# Patient Record
Sex: MALE | Race: ASIAN | NOT HISPANIC OR LATINO | URBAN - METROPOLITAN AREA
[De-identification: names, ages, dates, MRNs, and addresses within clinical notes are randomized per-mention and may not be internally consistent; named-entity substitution may affect disease eponyms.]

---

## 2018-08-05 ENCOUNTER — EMERGENCY (EMERGENCY)
Facility: HOSPITAL | Age: 23
LOS: 1 days | Discharge: HOME | End: 2018-08-05
Attending: EMERGENCY MEDICINE | Admitting: EMERGENCY MEDICINE
Payer: COMMERCIAL

## 2018-08-05 VITALS
OXYGEN SATURATION: 97 % | RESPIRATION RATE: 15 BRPM | DIASTOLIC BLOOD PRESSURE: 76 MMHG | TEMPERATURE: 99 F | HEART RATE: 88 BPM | SYSTOLIC BLOOD PRESSURE: 114 MMHG

## 2018-08-05 PROCEDURE — 99283 EMERGENCY DEPT VISIT LOW MDM: CPT | Mod: 25

## 2018-08-05 RX ORDER — IBUPROFEN 200 MG
600 TABLET ORAL ONCE
Qty: 0 | Refills: 0 | Status: COMPLETED | OUTPATIENT
Start: 2018-08-05 | End: 2018-08-05

## 2018-08-05 RX ORDER — ACETAMINOPHEN 500 MG
975 TABLET ORAL ONCE
Qty: 0 | Refills: 0 | Status: COMPLETED | OUTPATIENT
Start: 2018-08-05 | End: 2018-08-05

## 2018-08-05 RX ORDER — LIDOCAINE 4 G/100G
1 CREAM TOPICAL ONCE
Qty: 0 | Refills: 0 | Status: COMPLETED | OUTPATIENT
Start: 2018-08-05 | End: 2018-08-05

## 2018-08-05 RX ADMIN — LIDOCAINE 1 PATCH: 4 CREAM TOPICAL at 07:58

## 2018-08-05 RX ADMIN — Medication 600 MILLIGRAM(S): at 07:58

## 2018-08-05 RX ADMIN — Medication 975 MILLIGRAM(S): at 07:58

## 2018-08-05 NOTE — ED PROVIDER NOTE - MEDICAL DECISION MAKING DETAILS
23 Y M with atraumatic pain of the RLE in the anterior mid thigh area without overlying visual changes and still able to ambulate with only mild pain with certain motions. DDx: Strain/sprain. Patients hx consistent with soft tissue injury made worse by certain motions, improved with rest. Will give PO medications, lido patch. If pain not improved will consider imaging or further testing PRN.

## 2018-08-05 NOTE — ED ADULT TRIAGE NOTE - CHIEF COMPLAINT QUOTE
Pt c/o R upper thigh pain starting yesterday, worse this AM. Denies any injury.  +ROM to affected extremity.  Denies any urinary symptoms.  Denies any recent car rides or flights.  Denies any PMHx.

## 2018-08-05 NOTE — ED PROVIDER NOTE - OBJECTIVE STATEMENT
23 Y M with no pmhx presents with 2 days of upper R leg pain without known injury to the area. Pt states that he noticed some pain in his upper R leg that has been gradually worse over 2 days. He says that it felt like he pulled a muscle, located in the upper mid thigh on the internal aspect. He notices that the pain gets worse when moves the leg and seems better when he is laying still. He denies fever, nausea, vomiting, swelling, weakness, bruising or bleeding.

## 2018-08-05 NOTE — ED PROVIDER NOTE - ATTENDING CONTRIBUTION TO CARE
I performed a face to face bedside interview with patient regarding history of present illness, review of symptoms and past medical history. I completed an independent physical exam.  I have discussed patient's plan of care.   I agree with note as stated above, having amended the EMR as needed to reflect my findings. I have discussed the assessment and plan of care.  This includes during the time I functioned as the attending physician for this patient.  Attending Contribution to Care: agree with plan of resident. pt p/w r mid thigh pain, no rash, cellulitis, shingles. pulses intact, no skin changes. pt ambulating with persistent albeit improved able to ambulate. instructed to apply heat packs, ibuprofen and tylenol . stable for d/c. vss

## 2018-08-05 NOTE — ED PROVIDER NOTE - PHYSICAL EXAMINATION
MSK: Normal LLE, bilateral UEs.  RLE: visual inspection normal with swelling, redness, or bleeding. Pt without TTP in the area, no crepitus. There is mildly decreased MS of the thigh 2/2 pain. Patient is able to ambulate without difficulty and only mild pain, he is able to stand on the RLE alone without difficulty.

## 2021-01-13 ENCOUNTER — APPOINTMENT (OUTPATIENT)
Dept: NEUROLOGY | Facility: CLINIC | Age: 26
End: 2021-01-13
Payer: COMMERCIAL

## 2021-01-13 VITALS
DIASTOLIC BLOOD PRESSURE: 78 MMHG | WEIGHT: 176 LBS | SYSTOLIC BLOOD PRESSURE: 119 MMHG | HEIGHT: 66 IN | BODY MASS INDEX: 28.28 KG/M2 | HEART RATE: 72 BPM

## 2021-01-13 VITALS — TEMPERATURE: 98.3 F

## 2021-01-13 PROCEDURE — 99203 OFFICE O/P NEW LOW 30 MIN: CPT

## 2021-01-13 PROCEDURE — 99072 ADDL SUPL MATRL&STAF TM PHE: CPT

## 2021-01-13 RX ORDER — MECLIZINE HYDROCHLORIDE 25 MG/1
25 TABLET ORAL 3 TIMES DAILY
Qty: 50 | Refills: 0 | Status: ACTIVE | COMMUNITY
Start: 2021-01-13 | End: 1900-01-01

## 2021-01-13 NOTE — PHYSICAL EXAM
[FreeTextEntry1] : Constitutional:  Patient was well-developed, well-nourished and in no acute distress. \par \par Head:  Normocephalic, atraumatic. Tympanic membranes were clear. \par \par Neck:  Supple with full range of motion. \par \par Cardiovascular:  Cardiac rhythm was regular without murmur. There were no carotid bruits. Peripheral pulses were full and symmetric. \par \par Respiratory:  Lungs were clear. \par \par Abdomen:  Soft and nontender. \par \par Spine:  Nontender. \par \par Skin:  There were no rashes. \par \par NEUROLOGICAL EXAMINATION:\par \par Mental Status: Patient was alert and oriented. Speech was fluent. There was no dysarthria. \par \par Cranial Nerves: \par \par II: Visual acuity was 20/ 20 bilaterally with near card and glasses. Pupils were equal and reactive. Visual fields were full. Funduscopic examination was normal. \par \par III, IV, VI:  Eye movements were full without nystagmus. \par \par V: Facial sensation was intact. \par \par VII: Facial strength was normal. \par \par VIII: Hearing was equal.  When placed in a right lateral position, he became dizzy and had mild clockwise beating rotatory nystagmus.  The left side was not evaluated due to recent shoulder surgery.\par \par IX, X: Palatal movement was normal. Phonation was normal. \par \par XI: Sternocleidomastoids and trapezii were normal. \par \par XII: Tongue was midline and movements normal. There was no lingual atrophy or fasciculations. \par \par Motor Examination: Muscle bulk, tone and strength were normal. \par \par Sensory Examination: Pinprick, vibration and joint position sense were intact. \par \par Reflexes: DTRs were 1 at the biceps and knees, 2 at the ankles and absent elsewhere.\par \par Plantar Responses: Plantar responses were flexor. \par \par Coordination/Cerebellar Function: There was no dysmetria on finger to nose or heel to shin testing. \par \par Gait/Stance: Gait and tandem were normal. Romberg was negative.\par

## 2021-01-13 NOTE — ASSESSMENT
[FreeTextEntry1] : Mr. Duncan is a 25-year-old with a long history of paroxysmal vestibular symptoms.  His symptoms are easily reproducible by positional change.  I do not believe that this represents "basilar migraine" but rather peripheral vestibular dysfunction.\par \par I prescribed meclizine for symptomatic treatment.  He will be referred for neurotology consultation.  I will request an MRI of the brain and internal auditory canals with and without contrast.  For completeness, I will also order a serologic evaluation including 68 Kd antibodies.  Further management will depend upon these results and his clinical course.

## 2021-01-13 NOTE — HISTORY OF PRESENT ILLNESS
[FreeTextEntry1] : I had the pleasure of seeing Mr. Vincenzo Duncan in the office today.  He is a 25 year right-handed patient who was referred for neurologic evaluation at your kind suggestion.\par \par Mr. Duncan has a long history of paroxysmal dizziness and nausea dating back to his early childhood.  In 2015, he experienced an acute attack of dizziness and nausea aggravated by change in position and gaze.  He was evaluated in the emergency room.  His symptoms resolved after treatment with Toradol.  He experienced subsequent brief episodes following 2 head transplant procedures in 2016 and 2017.\par \par On December 1, 2020, he underwent a left shoulder labral and biceps tendon repair.  About 4 days later, he developed dizziness and nausea aggravated by downward gaze and movement.  This gradually improved over 2 weeks but then recurred about 5 days later.  When he assumes a supine position and turns his head to the right he becomes dizzy and nauseous.\par \par He is experienced 2 recent brief episodes of bitemporal and periorbital throbbing headache relieved with Advil.\par \par He denies cognitive, visual, otic, swallowing, motor, sensory or sphincteric difficulties.  It does not appear that he ever underwent cerebral imaging.\par

## 2021-01-23 ENCOUNTER — LABORATORY RESULT (OUTPATIENT)
Age: 26
End: 2021-01-23

## 2021-01-24 ENCOUNTER — NON-APPOINTMENT (OUTPATIENT)
Age: 26
End: 2021-01-24

## 2021-01-24 LAB
ALBUMIN SERPL ELPH-MCNC: 4.6 G/DL
ALP BLD-CCNC: 72 U/L
ALT SERPL-CCNC: 42 U/L
ANION GAP SERPL CALC-SCNC: 13 MMOL/L
AST SERPL-CCNC: 20 U/L
B BURGDOR IGG+IGM SER QL IB: NORMAL
BASOPHILS # BLD AUTO: 0.04 K/UL
BASOPHILS NFR BLD AUTO: 0.7 %
BILIRUB SERPL-MCNC: 0.3 MG/DL
BUN SERPL-MCNC: 12 MG/DL
CALCIUM SERPL-MCNC: 9.7 MG/DL
CHLORIDE SERPL-SCNC: 103 MMOL/L
CO2 SERPL-SCNC: 24 MMOL/L
CREAT SERPL-MCNC: 0.91 MG/DL
CRP SERPL-MCNC: 0.34 MG/DL
EOSINOPHIL # BLD AUTO: 0.09 K/UL
EOSINOPHIL NFR BLD AUTO: 1.5 %
ERYTHROCYTE [SEDIMENTATION RATE] IN BLOOD BY WESTERGREN METHOD: 7 MM/HR
GLUCOSE SERPL-MCNC: 112 MG/DL
HCT VFR BLD CALC: 42.9 %
HGB BLD-MCNC: 13.9 G/DL
IMM GRANULOCYTES NFR BLD AUTO: 0.2 %
LYMPHOCYTES # BLD AUTO: 2.3 K/UL
LYMPHOCYTES NFR BLD AUTO: 39.1 %
MAN DIFF?: NORMAL
MCHC RBC-ENTMCNC: 25.6 PG
MCHC RBC-ENTMCNC: 32.4 GM/DL
MCV RBC AUTO: 79.2 FL
MONOCYTES # BLD AUTO: 0.4 K/UL
MONOCYTES NFR BLD AUTO: 6.8 %
NEUTROPHILS # BLD AUTO: 3.04 K/UL
NEUTROPHILS NFR BLD AUTO: 51.7 %
PLATELET # BLD AUTO: 189 K/UL
POTASSIUM SERPL-SCNC: 4.3 MMOL/L
PROT SERPL-MCNC: 7.2 G/DL
RBC # BLD: 5.42 M/UL
RBC # FLD: 13.2 %
SODIUM SERPL-SCNC: 140 MMOL/L
TSH SERPL-ACNC: 2.07 UIU/ML
WBC # FLD AUTO: 5.88 K/UL

## 2021-01-25 LAB
B BURGDOR AB SER-IMP: NEGATIVE
B BURGDOR IGM PATRN SER IB-IMP: NEGATIVE
B BURGDOR18KD IGG SER QL IB: PRESENT
B BURGDOR23KD IGG SER QL IB: NORMAL
B BURGDOR23KD IGM SER QL IB: NORMAL
B BURGDOR28KD IGG SER QL IB: NORMAL
B BURGDOR30KD IGG SER QL IB: NORMAL
B BURGDOR31KD IGG SER QL IB: NORMAL
B BURGDOR39KD IGG SER QL IB: NORMAL
B BURGDOR39KD IGM SER QL IB: NORMAL
B BURGDOR41KD IGG SER QL IB: PRESENT
B BURGDOR41KD IGM SER QL IB: NORMAL
B BURGDOR45KD IGG SER QL IB: NORMAL
B BURGDOR58KD IGG SER QL IB: NORMAL
B BURGDOR66KD IGG SER QL IB: NORMAL
B BURGDOR93KD IGG SER QL IB: NORMAL
T PALLIDUM AB SER QL IA: NEGATIVE

## 2021-01-26 LAB
ANACR T: NEGATIVE
INNER EAR 68KD AB FLD QL: NEGATIVE

## 2021-02-20 ENCOUNTER — APPOINTMENT (OUTPATIENT)
Dept: MRI IMAGING | Facility: CLINIC | Age: 26
End: 2021-02-20
Payer: COMMERCIAL

## 2021-02-20 ENCOUNTER — OUTPATIENT (OUTPATIENT)
Dept: OUTPATIENT SERVICES | Facility: HOSPITAL | Age: 26
LOS: 1 days | End: 2021-02-20
Payer: COMMERCIAL

## 2021-02-20 DIAGNOSIS — R42 DIZZINESS AND GIDDINESS: ICD-10-CM

## 2021-02-20 PROCEDURE — 70553 MRI BRAIN STEM W/O & W/DYE: CPT

## 2021-02-20 PROCEDURE — A9585: CPT

## 2021-02-20 PROCEDURE — 70553 MRI BRAIN STEM W/O & W/DYE: CPT | Mod: 26

## 2021-02-23 ENCOUNTER — APPOINTMENT (OUTPATIENT)
Dept: OTOLARYNGOLOGY | Facility: CLINIC | Age: 26
End: 2021-02-23
Payer: COMMERCIAL

## 2021-02-23 ENCOUNTER — NON-APPOINTMENT (OUTPATIENT)
Age: 26
End: 2021-02-23

## 2021-02-23 PROCEDURE — 99072 ADDL SUPL MATRL&STAF TM PHE: CPT

## 2021-02-23 PROCEDURE — 92557 COMPREHENSIVE HEARING TEST: CPT

## 2021-02-23 PROCEDURE — 92567 TYMPANOMETRY: CPT

## 2021-02-23 PROCEDURE — 99205 OFFICE O/P NEW HI 60 MIN: CPT | Mod: 25

## 2021-02-24 NOTE — PROCEDURE
[Risk and Benefits Discussed] : The purpose, risks, discomforts, benefits and alternatives of the procedure have been explained to the patient including no treatment. [Cerumen Impaction] : Cerumen Impaction [Same] : same as the Pre Op Dx. [] : Removal of Cerumen [FreeTextEntry1] : vertigo [FreeTextEntry4] : none [FreeTextEntry6] : Cerumen was removed under binocular microscopy with a combination of a suction and/or a loop curette. The patient tolerated the procedure well and there were no complications. The included findings were noted.\par

## 2021-02-24 NOTE — HISTORY OF PRESENT ILLNESS
[de-identified] : 26 y/o male referred by Dr. Law for dizziness.  Dizziness initially started after should surgery on 12/1/2020.  Immediately after surgery he had two weeks of dizziness with head movement.  This gradually improved.  This has since evolved into room spinning vertigo when laying in bed and rolling onto the right side.  Pt. has started STARS VRT (eval c/w vest. neuritis) and has had a normal MRI.  Today pt. feels almost normal, just dizzy when transitioning from laying to sitting.

## 2021-02-24 NOTE — PHYSICAL EXAM
[Binocular Microscopic Exam] : Binocular microscopic exam was performed [Normal] : no rashes [Nystagmus] : ~T no ~M nystagmus was seen [Fukuda Step Test] : Fukuda Step Test was Negative [Romberg's Sign] : Romberg's sign was absent [Fistula Sign] : Fistula Sign: Negative [Past-Pointing] : Past-Pointing: Negative [[place]] : [place]: Negative [FreeTextEntry9] : cerumen impaction [FreeTextEntry1] : negative jayme-hallpike exam today.\par Pt. with brief dizziness when sitting up after exam c/w orthostatic hypotension

## 2021-02-24 NOTE — DATA REVIEWED
[de-identified] : I personally reviewed the patient's audiogram, which shows\par hearing WNL AU\par type A tymps AU\par excellent speech discrimination AU

## 2021-02-24 NOTE — REASON FOR VISIT
[Initial Consultation] : an initial consultation for [FreeTextEntry2] : referred by Dr. Law for dizziness

## 2021-03-26 ENCOUNTER — APPOINTMENT (OUTPATIENT)
Dept: NEUROLOGY | Facility: CLINIC | Age: 26
End: 2021-03-26

## 2021-07-06 ENCOUNTER — APPOINTMENT (OUTPATIENT)
Dept: OTOLARYNGOLOGY | Facility: CLINIC | Age: 26
End: 2021-07-06
Payer: COMMERCIAL

## 2021-07-06 DIAGNOSIS — H81.21 VESTIBULAR NEURONITIS, RIGHT EAR: ICD-10-CM

## 2021-07-06 DIAGNOSIS — R51.9 HEADACHE, UNSPECIFIED: ICD-10-CM

## 2021-07-06 DIAGNOSIS — R42 DIZZINESS AND GIDDINESS: ICD-10-CM

## 2021-07-06 PROCEDURE — 99213 OFFICE O/P EST LOW 20 MIN: CPT

## 2021-07-06 PROCEDURE — 99072 ADDL SUPL MATRL&STAF TM PHE: CPT

## 2021-07-07 PROBLEM — H81.21 VESTIBULAR NEURONITIS OF RIGHT EAR: Status: ACTIVE | Noted: 2021-02-24

## 2021-07-07 NOTE — HISTORY OF PRESENT ILLNESS
[de-identified] : 26M presents for f/u dizziness\par completed his VRT, and is challenging his balance with hiking and basketball\par has been more active\par has had no symptoms since March\par had not watched diet or taking Mg supplements\par denies headaches, dizziness, otalgia or otorrhea